# Patient Record
Sex: MALE | Race: WHITE | NOT HISPANIC OR LATINO | Employment: FULL TIME | URBAN - METROPOLITAN AREA
[De-identification: names, ages, dates, MRNs, and addresses within clinical notes are randomized per-mention and may not be internally consistent; named-entity substitution may affect disease eponyms.]

---

## 2017-04-21 ENCOUNTER — HISTORICAL (OUTPATIENT)
Dept: RADIOLOGY | Facility: HOSPITAL | Age: 37
End: 2017-04-21

## 2017-04-28 ENCOUNTER — HISTORICAL (OUTPATIENT)
Dept: RADIOLOGY | Facility: HOSPITAL | Age: 37
End: 2017-04-28

## 2020-02-13 ENCOUNTER — HISTORICAL (OUTPATIENT)
Dept: RADIOLOGY | Facility: HOSPITAL | Age: 40
End: 2020-02-13

## 2020-08-25 ENCOUNTER — HISTORICAL (OUTPATIENT)
Dept: ANESTHESIOLOGY | Facility: HOSPITAL | Age: 40
End: 2020-08-25

## 2022-04-09 ENCOUNTER — HISTORICAL (OUTPATIENT)
Dept: ADMINISTRATIVE | Facility: HOSPITAL | Age: 42
End: 2022-04-09

## 2022-04-29 VITALS
HEIGHT: 73 IN | WEIGHT: 227.94 LBS | BODY MASS INDEX: 30.21 KG/M2 | SYSTOLIC BLOOD PRESSURE: 131 MMHG | DIASTOLIC BLOOD PRESSURE: 65 MMHG

## 2022-04-30 NOTE — OP NOTE
Patient:   Karlos Quinonez            MRN: [id number]            FIN: [id number]               Age:   39 years     Sex:  Male     :  1980   Associated Diagnoses:   Bright red rectal bleeding; Bleeding external hemorrhoids   Author:   Gale Bennett MD      Operative Note   Operative Information   Date/ Time:  2020 10:26:00.     Procedures Performed: Procedure Code   Colonoscopy, flexible; diagnostic, including collection of specimen(s) by brushing or washing, when performed (separate procedure) (53109)..     Indications: 39-year-old white male with complaint of bright red blood with bowel movement in need of diagnostic colonoscopy for evaluation.     Preoperative Diagnosis: Bright red rectal bleeding (BAV55-CV K62.5).     Postoperative Diagnosis: Bleeding external hemorrhoids (PBR08-VE K64.4).     Surgeon: Gale Bennett MD.     Anesthesia: Intravenous MAC.     Speciman Removed: None.     Description of Procedure/Findings/    Complications: Patient was brought to the endoscopy suite laid in a left lateral decubitus position right side up.  Intravenous anesthesia was provided.  Digital rectal exam performed exhibiting good anal rectal tone and no masses.  An endoscope was then passed through the anus intubating the rectum and with gentle insufflation reaching the cecum.  Upon reaching the cecum pictures were taken the scope was then slowly withdrawn.  Overall the cecum ascending transverse descending and rectosigmoid colon all appear to be grossly normal without mass polyp or ulceration seen.  Scope was retroflexed in the distal rectum revealing small internal hemorrhoids without sign of thrombosis ulceration or bleeding.  Along the external anal canal there was notable small external hemorrhoids which did have the signs of previous bleeding and small clots.  Scope was removed in neutral position the colon was evacuated of air.  The patient was relieved of anesthesia stable condition and transfer  the postanesthesia care unit..     Esimated blood loss: No blood loss.     Findings: Changes significant for previous bleeding external hemorrhoids.     Complications: None.

## 2023-01-23 ENCOUNTER — ANESTHESIA EVENT (OUTPATIENT)
Dept: SURGERY | Facility: HOSPITAL | Age: 43
End: 2023-01-23

## 2023-01-23 DIAGNOSIS — K58.1 IRRITABLE BOWEL SYNDROME WITH CONSTIPATION: Primary | ICD-10-CM

## 2023-01-24 ENCOUNTER — CLINICAL SUPPORT (OUTPATIENT)
Dept: RESPIRATORY THERAPY | Facility: HOSPITAL | Age: 43
End: 2023-01-24
Attending: INTERNAL MEDICINE
Payer: COMMERCIAL

## 2023-01-24 DIAGNOSIS — K58.1 IRRITABLE BOWEL SYNDROME WITH CONSTIPATION: ICD-10-CM

## 2023-01-24 PROCEDURE — 93010 EKG 12-LEAD: ICD-10-PCS | Mod: ,,, | Performed by: INTERNAL MEDICINE

## 2023-01-24 PROCEDURE — 93005 ELECTROCARDIOGRAM TRACING: CPT

## 2023-01-24 PROCEDURE — 93010 ELECTROCARDIOGRAM REPORT: CPT | Mod: ,,, | Performed by: INTERNAL MEDICINE

## 2023-01-24 RX ORDER — OMEPRAZOLE 40 MG/1
CAPSULE, DELAYED RELEASE ORAL
COMMUNITY
Start: 2023-01-17

## 2023-01-25 NOTE — ANESTHESIA PREPROCEDURE EVALUATION
01/25/2023  Karlos Quinonez is a 42 y.o., male.      Pre-op Assessment    I have reviewed the Patient Summary Reports.    I have reviewed the NPO Status.   I have reviewed the Medications.     Review of Systems  Anesthesia Hx:  No problems with previous Anesthesia  Neg history of prior surgery. Denies Family Hx of Anesthesia complications.   Denies Personal Hx of Anesthesia complications.   Social:  Non-Smoker    Hematology/Oncology:  Hematology Normal   Oncology Normal     EENT/Dental:EENT/Dental Normal   Cardiovascular:  Cardiovascular Normal     Pulmonary:  Pulmonary Normal    Renal/:  Renal/ Normal     Hepatic/GI:   GERD    Musculoskeletal:  Musculoskeletal Normal    Neurological:  Neurology Normal    Endocrine:  Obesity / BMI > 30  Dermatological:  Skin Normal    Psych:  Psychiatric Normal           Physical Exam  General: Cooperative and Alert    Airway:  Mallampati: II   Mouth Opening: Normal  TM Distance: Normal  Tongue: Normal  Neck ROM: Normal ROM    Dental:  Intact        Anesthesia Plan  Type of Anesthesia, risks & benefits discussed:    Anesthesia Type: Gen Natural Airway  Intra-op Monitoring Plan: Standard ASA Monitors  Post Op Pain Control Plan: multimodal analgesia  Induction:  IV  Informed Consent: Informed consent signed with the Patient and all parties understand the risks and agree with anesthesia plan.  All questions answered. Patient consented to blood products? Yes  ASA Score: 2  Day of Surgery Review of History & Physical: I have interviewed and examined the patient. I have reviewed the patient's H&P dated: There are no significant changes.     Ready For Surgery From Anesthesia Perspective.     .

## 2023-01-26 ENCOUNTER — HOSPITAL ENCOUNTER (OUTPATIENT)
Facility: HOSPITAL | Age: 43
Discharge: HOME | End: 2023-01-26
Attending: INTERNAL MEDICINE | Admitting: INTERNAL MEDICINE

## 2023-01-26 ENCOUNTER — ANESTHESIA (OUTPATIENT)
Dept: SURGERY | Facility: HOSPITAL | Age: 43
End: 2023-01-26

## 2023-01-26 VITALS
HEIGHT: 72 IN | SYSTOLIC BLOOD PRESSURE: 135 MMHG | HEART RATE: 52 BPM | OXYGEN SATURATION: 94 % | WEIGHT: 225 LBS | DIASTOLIC BLOOD PRESSURE: 89 MMHG | TEMPERATURE: 97 F | RESPIRATION RATE: 18 BRPM | BODY MASS INDEX: 30.48 KG/M2

## 2023-01-26 DIAGNOSIS — K58.9 IBS (IRRITABLE BOWEL SYNDROME): ICD-10-CM

## 2023-01-26 DIAGNOSIS — R14.0 ABDOMINAL DISTENTION: ICD-10-CM

## 2023-01-26 DIAGNOSIS — K92.1 MELENA: ICD-10-CM

## 2023-01-26 DIAGNOSIS — K58.1 IRRITABLE BOWEL SYNDROME WITH CONSTIPATION: ICD-10-CM

## 2023-01-26 PROCEDURE — 45385 COLONOSCOPY W/LESION REMOVAL: CPT | Mod: 59 | Performed by: INTERNAL MEDICINE

## 2023-01-26 PROCEDURE — 63600175 PHARM REV CODE 636 W HCPCS: Performed by: ANESTHESIOLOGY

## 2023-01-26 PROCEDURE — 45384 COLONOSCOPY W/LESION REMOVAL: CPT | Performed by: INTERNAL MEDICINE

## 2023-01-26 PROCEDURE — 43239 EGD BIOPSY SINGLE/MULTIPLE: CPT | Performed by: INTERNAL MEDICINE

## 2023-01-26 PROCEDURE — C1773 RET DEV, INSERTABLE: HCPCS | Performed by: INTERNAL MEDICINE

## 2023-01-26 PROCEDURE — 37000008 HC ANESTHESIA 1ST 15 MINUTES: Performed by: INTERNAL MEDICINE

## 2023-01-26 PROCEDURE — 25000003 PHARM REV CODE 250: Performed by: NURSE ANESTHETIST, CERTIFIED REGISTERED

## 2023-01-26 PROCEDURE — 27201423 OPTIME MED/SURG SUP & DEVICES STERILE SUPPLY: Performed by: INTERNAL MEDICINE

## 2023-01-26 PROCEDURE — 37000009 HC ANESTHESIA EA ADD 15 MINS: Performed by: INTERNAL MEDICINE

## 2023-01-26 PROCEDURE — 63600175 PHARM REV CODE 636 W HCPCS: Performed by: NURSE ANESTHETIST, CERTIFIED REGISTERED

## 2023-01-26 RX ORDER — LIDOCAINE HYDROCHLORIDE 20 MG/ML
INJECTION, SOLUTION EPIDURAL; INFILTRATION; INTRACAUDAL; PERINEURAL
Status: DISCONTINUED | OUTPATIENT
Start: 2023-01-26 | End: 2023-01-26

## 2023-01-26 RX ORDER — FENTANYL CITRATE 50 UG/ML
INJECTION, SOLUTION INTRAMUSCULAR; INTRAVENOUS
Status: DISCONTINUED | OUTPATIENT
Start: 2023-01-26 | End: 2023-01-26

## 2023-01-26 RX ORDER — SODIUM CHLORIDE, SODIUM LACTATE, POTASSIUM CHLORIDE, CALCIUM CHLORIDE 600; 310; 30; 20 MG/100ML; MG/100ML; MG/100ML; MG/100ML
INJECTION, SOLUTION INTRAVENOUS CONTINUOUS
Status: DISCONTINUED | OUTPATIENT
Start: 2023-01-26 | End: 2023-01-26 | Stop reason: HOSPADM

## 2023-01-26 RX ORDER — PROPOFOL 10 MG/ML
VIAL (ML) INTRAVENOUS
Status: DISCONTINUED | OUTPATIENT
Start: 2023-01-26 | End: 2023-01-26

## 2023-01-26 RX ADMIN — SODIUM CHLORIDE, POTASSIUM CHLORIDE, SODIUM LACTATE AND CALCIUM CHLORIDE: 600; 310; 30; 20 INJECTION, SOLUTION INTRAVENOUS at 06:01

## 2023-01-26 RX ADMIN — PROPOFOL 50 MG: 10 INJECTION, EMULSION INTRAVENOUS at 06:01

## 2023-01-26 RX ADMIN — PROPOFOL 100 MG: 10 INJECTION, EMULSION INTRAVENOUS at 06:01

## 2023-01-26 RX ADMIN — PROPOFOL 50 MG: 10 INJECTION, EMULSION INTRAVENOUS at 07:01

## 2023-01-26 RX ADMIN — LIDOCAINE HYDROCHLORIDE 5 ML: 20 INJECTION, SOLUTION EPIDURAL; INFILTRATION; INTRACAUDAL; PERINEURAL at 06:01

## 2023-01-26 RX ADMIN — FENTANYL CITRATE 100 MCG: 50 INJECTION, SOLUTION INTRAMUSCULAR; INTRAVENOUS at 06:01

## 2023-01-26 NOTE — OP NOTE
[facility] - Periop Services  Operative Note    Date of Procedure: 1/26/2023     Procedure: Procedure(s) (LRB):  COLONOSCOPY (N/A)  EGD (ESOPHAGOGASTRODUODENOSCOPY) (N/A)  EGD, WITH CLOSED BIOPSY (N/A)  COLONOSCOPY, WITH POLYPECTOMY USING HOT BIOPSY FORCEPS (N/A)  COLONOSCOPY, WITH POLYPECTOMY USING SNARE (N/A)     Colonoscopy with polypectomy hot snare and hot biopsy    Surgeon(s) and Role:     * Lisandro Hernandez III, MD - Primary    Assisting Surgeon: None    Pre-Operative Diagnosis: Irritable bowel syndrome with constipation [K58.1]  Abdominal distention [R14.0]  Melena [K92.1]   hematochezia    Post-Operative Diagnosis: Post-Op Diagnosis Codes:     * Irritable bowel syndrome with constipation [K58.1]     * Abdominal distention [R14.0]     * Melena [K92.1]   Grade 1 internal hemorrhoid.  Grade 1 external hemorrhoid ( at the dentate line actively ooze).    Scant diverticular disease left side of the colon.    Endoscopic Specimens:  ID Type Source Tests Collected by Time Destination   A : Bx  gastric antrum - H-pylori Tissue Antrum SPECIMEN TO PATHOLOGY Lisandro Hernandez III, MD 1/26/2023 0644          Anesthesia: Monitor Anesthesia Care    Consent:  Patient was consented for the procedure at my office.  The risks and benefits of procedure explained in detail.  They were willing to undergo those risks.    HPI & Operative Findings (including complications, if any):   Again 42-year-old white male with bloating abdominal transit issues chronic constipation.  He is had a number of colonoscopies in 2019 and 15 by Dr. Horne and Dr. Rudolph respectively these were normal.  We are performing colonoscopy today for any new signs of concern in terms of his hematochezia.      Gene Melgar CRNA present.      After patient was laid in left lateral decubitus position, rectal exam was performed.  Careful examination showed a very small punctate area of the did right at the dentate line that showed a small hemorrhoidal  lesion it was rather venous in nature and not actively oozing but recently oozing.  At the end of the case, I was in retroflexion and I saw grade 1 internal hemorrhoid that looked red and beefy like it just ooze as the patient's history was prior to endoscopy told me it was bleeding yesterday during the prep.      Nonetheless prostate exam was within normal limits.      The colonoscope was then lubricated advanced all with cecum cecum was visualized and photographed.  The terminal ileum was intubated up to 10 cm there were no signs of irritable tissue no signs of friability.  No biopsies were therefore taken.  He had essentially a healthy appearing terminal ileum.      The scope was pulled back 360 degree circumferential views were taken of the entirety colon.  In the cecum there were no mass lesions or polyps.  In the ascending colon there was a small 3 mm polyp that was removed combination hot snare and piecemeal on with biopsy forceps on electrocautery settings of 12/6.  Blood loss was negligible.  Polyp was irretrievable because of some stool that was on that side.  Otherwise rest of the ascending colon within normal limits     The  hepatic flexure transverse colon splenic flexure were within normal limits.  The descending colon showed some very scant diverticular disease distally into the sigmoid.  The rectum on retroflexion again as mentioned above, some grade 1 internal hemorrhoids but no signs of abnormal tissue to the rectum and not worthy of any biopsies.      Discussion:    Patient's source of hematochezia is due to internal hemorrhoids.  Would recommend refer to general surgery for that removal.  Additionally I would just use stool softeners and monitor and give reassurance for the dentate line which I think should resolve on its own.    Would recommend repeat colonoscopy in 5 years due to the polyps irretrievable nature it was less than a cm but I will do this just to be thorough and follow up with him.         Blood Loss (EBL): * No values recorded between 1/26/2023  6:30 AM and 1/26/2023  7:25 AM *           Implants: * No implants in log *    Specimens:   Specimen (24h ago, onward)       Start     Ordered    01/26/23 0644  Specimen to Pathology  RELEASE UPON ORDERING        References:    Click here for ordering Quick Tip   Question:  Release to patient  Answer:  Immediate    01/26/23 0644                            Condition: Stable for Discharge    Disposition: Home or Self Care        Discharge Note    OUTCOME: Patient tolerated treatment/procedure well without complication and is now ready for discharge.    DISPOSITION: Home or Self Care    FINAL DIAGNOSIS:  <principal problem not specified>    FOLLOWUP: Follow up in clinic in 1-2 weeks to review biopsiesOf the stomach.  Recommend 5 years for the colon.    DISCHARGE INSTRUCTIONS:  No discharge procedures on file.

## 2023-01-26 NOTE — OP NOTE
[facility] - Periop Services  Operative Note    Date of Procedure: 1/26/2023     Procedure: Procedure(s) (LRB):  COLONOSCOPY (N/A)  EGD (ESOPHAGOGASTRODUODENOSCOPY) (N/A)  EGD, WITH CLOSED BIOPSY (N/A)  COLONOSCOPY, WITH POLYPECTOMY USING HOT BIOPSY FORCEPS (N/A)  COLONOSCOPY, WITH POLYPECTOMY USING SNARE (N/A)    EGD with biopsy    Surgeon(s) and Role:     * Lisandro Hernandez III, MD - Primary    Assisting Surgeon: None    Pre-Operative Diagnosis: Irritable bowel syndrome with constipation [K58.1]  Abdominal distention [R14.0]  Melena [K92.1]   Abdominal distention    Post-Operative Diagnosis: Post-Op Diagnosis Codes:     * Irritable bowel syndrome with constipation [K58.1]     * Abdominal distention [R14.0]     * Melena [K92.1]   Mild gastritis.  Status post H pylori biopsy gastric antrum    Endoscopic Specimens:  ID Type Source Tests Collected by Time Destination   A : Bx  gastric antrum - H-pylori Tissue Antrum SPECIMEN TO PATHOLOGY Lisandro Hernandez III, MD 1/26/2023 0644          Anesthesia: Monitor Anesthesia Care    Consent:  Patient was consented for the procedure at my office.  The risks and benefits of procedure explained in detail.  They were willing to undergo those risks.    HPI & Operative Findings (including complications, if any):  this is a 42-year-old white male with a history of bloating for number of years.  He has no alarm symptoms vitals normal, H&H normal.  Performing EGD to ascertain why.      He was anesthetized by cadence Melgar CRNA present.      Patient's laid in supine position bite block was placed.  Anesthesia was given EGD Olympus gastroscope was then lubricated inserted the posterior oropharynx were there were no  abnormalities.  The scope was passed down through the esophageal column without any difficulties.  Stomach showed very mild chronic gastritis at best.  Duodenum was then evaluated there were no abnormalities noted to the duodenum.  Good villi no scalloping  reasons for biopsy.      The scope was pulled back biopsies taken of the gastric antrum for H pylori.  He would good smooth very very mild mucosal changes but on not worthy of biopsy throughout the entire stomach.  The greater lesser curvatures antrum cardiac and fundic regions were all within normal limits.  No hiatal hernias.  The patient did snore a little bit when he was sleeping.      The scope was pulled back the you GE junction was within normal limits.  No signs of esophagitis.  Esophageal column normal.  Of note, there was mild questionable peristalsis to the stomach.    Discussion:    Follow up on the biopsies.  Consider also oddly enough considering this patient's general state of health a gastric emptying study because of the mild peristalsis saw the stomach.  Otherwise I would focus more on diet and perhaps even FODMAP diet or a SIBO  workup.    Blood Loss (EBL): * No values recorded between 1/26/2023  6:30 AM and 1/26/2023  7:25 AM *           Implants: * No implants in log *    Specimens:   Specimen (24h ago, onward)       Start     Ordered    01/26/23 0644  Specimen to Pathology  RELEASE UPON ORDERING        References:    Click here for ordering Quick Tip   Question:  Release to patient  Answer:  Immediate    01/26/23 0644                            Condition: Stable for Discharge    Disposition: Home or Self Care        Discharge Note    OUTCOME: Patient tolerated treatment/procedure well without complication and is now ready for discharge.    DISPOSITION: Home or Self Care    FINAL DIAGNOSIS:  <principal problem not specified>    FOLLOWUP: Follow up in clinic in 1-2 weeks to review biopsies    DISCHARGE INSTRUCTIONS:  No discharge procedures on file.

## 2023-01-26 NOTE — ANESTHESIA POSTPROCEDURE EVALUATION
Anesthesia Post Evaluation    Patient: Karlos Quinonez    Procedure(s) Performed: Procedure(s) (LRB):  COLONOSCOPY (N/A)  EGD (ESOPHAGOGASTRODUODENOSCOPY) (N/A)  EGD, WITH CLOSED BIOPSY (N/A)    Final Anesthesia Type: MAC      Patient location during evaluation: OPS  Patient participation: Yes- Able to Participate  Level of consciousness: awake and alert  Post-procedure vital signs: reviewed and stable  Pain management: adequate  Airway patency: patent  JAGDISH mitigation strategies: Multimodal analgesia  PONV status at discharge: No PONV  Anesthetic complications: no      Cardiovascular status: hemodynamically stable  Respiratory status: unassisted, spontaneous ventilation and room air  Hydration status: euvolemic  Follow-up not needed.  Comments: Patient to bed per self          Vitals Value Taken Time   /81 01/26/23 0559   Temp 36.9 °C (98.4 °F) 01/26/23 0559   Pulse 62 01/26/23 0559   Resp 18 01/26/23 0649   SpO2 94 % 01/26/23 0559         No case tracking events are documented in the log.      Pain/Harsh Score: No data recorded

## 2023-01-27 LAB — PSYCHE PATHOLOGY RESULT: NORMAL

## (undated) DEVICE — SNARE POLYP STD SNG 7FR

## (undated) DEVICE — KIT SURGICAL COLON .25 1.1OZ

## (undated) DEVICE — FORCEP ENDO BIOPSY CAPTURA

## (undated) DEVICE — PAD SUREFIT GRND ELECTRD 10FT

## (undated) DEVICE — TRAP SUCTION POLYP

## (undated) DEVICE — BITE BLOCK ADULT LATEX FREE